# Patient Record
Sex: MALE | Race: WHITE | NOT HISPANIC OR LATINO | Employment: UNEMPLOYED | URBAN - METROPOLITAN AREA
[De-identification: names, ages, dates, MRNs, and addresses within clinical notes are randomized per-mention and may not be internally consistent; named-entity substitution may affect disease eponyms.]

---

## 2024-04-09 ENCOUNTER — APPOINTMENT (OUTPATIENT)
Dept: RADIOLOGY | Facility: HOSPITAL | Age: 24
End: 2024-04-09
Payer: COMMERCIAL

## 2024-04-09 ENCOUNTER — APPOINTMENT (OUTPATIENT)
Dept: CARDIOLOGY | Facility: HOSPITAL | Age: 24
End: 2024-04-09
Payer: COMMERCIAL

## 2024-04-09 ENCOUNTER — HOSPITAL ENCOUNTER (EMERGENCY)
Facility: HOSPITAL | Age: 24
Discharge: HOME | End: 2024-04-09
Attending: GENERAL PRACTICE
Payer: COMMERCIAL

## 2024-04-09 VITALS
RESPIRATION RATE: 17 BRPM | OXYGEN SATURATION: 99 % | SYSTOLIC BLOOD PRESSURE: 117 MMHG | DIASTOLIC BLOOD PRESSURE: 68 MMHG | HEART RATE: 76 BPM | TEMPERATURE: 97 F | WEIGHT: 240 LBS

## 2024-04-09 DIAGNOSIS — R55 SYNCOPE AND COLLAPSE: Primary | ICD-10-CM

## 2024-04-09 LAB
ALBUMIN SERPL BCP-MCNC: 3.9 G/DL (ref 3.4–5)
ALP SERPL-CCNC: 72 U/L (ref 33–120)
ALT SERPL W P-5'-P-CCNC: 22 U/L (ref 7–52)
ANION GAP SERPL CALC-SCNC: 12 MMOL/L (ref 10–20)
AST SERPL W P-5'-P-CCNC: 16 U/L (ref 9–39)
BASOPHILS # BLD AUTO: 0.05 X10*3/UL (ref 0–0.1)
BASOPHILS NFR BLD AUTO: 0.6 %
BILIRUB SERPL-MCNC: 0.3 MG/DL (ref 0–1.2)
BUN SERPL-MCNC: 15 MG/DL (ref 6–23)
CALCIUM SERPL-MCNC: 8.6 MG/DL (ref 8.6–10.3)
CARDIAC TROPONIN I PNL SERPL HS: <3 NG/L (ref 0–20)
CHLORIDE SERPL-SCNC: 106 MMOL/L (ref 98–107)
CO2 SERPL-SCNC: 24 MMOL/L (ref 21–32)
CREAT SERPL-MCNC: 0.78 MG/DL (ref 0.5–1.3)
EGFRCR SERPLBLD CKD-EPI 2021: >90 ML/MIN/1.73M*2
EOSINOPHIL # BLD AUTO: 0.15 X10*3/UL (ref 0–0.7)
EOSINOPHIL NFR BLD AUTO: 1.7 %
ERYTHROCYTE [DISTWIDTH] IN BLOOD BY AUTOMATED COUNT: 12.8 % (ref 11.5–14.5)
GLUCOSE SERPL-MCNC: 82 MG/DL (ref 74–99)
HCT VFR BLD AUTO: 42.5 % (ref 36–52)
HGB BLD-MCNC: 14.2 G/DL (ref 12–17.5)
IMM GRANULOCYTES # BLD AUTO: 0.01 X10*3/UL (ref 0–0.7)
IMM GRANULOCYTES NFR BLD AUTO: 0.1 % (ref 0–0.9)
LYMPHOCYTES # BLD AUTO: 1.62 X10*3/UL (ref 1.2–4.8)
LYMPHOCYTES NFR BLD AUTO: 18.5 %
MCH RBC QN AUTO: 29.2 PG (ref 26–34)
MCHC RBC AUTO-ENTMCNC: 33.4 G/DL (ref 32–36)
MCV RBC AUTO: 87 FL (ref 80–100)
MONOCYTES # BLD AUTO: 0.54 X10*3/UL (ref 0.1–1)
MONOCYTES NFR BLD AUTO: 6.2 %
NEUTROPHILS # BLD AUTO: 6.4 X10*3/UL (ref 1.2–7.7)
NEUTROPHILS NFR BLD AUTO: 72.9 %
NRBC BLD-RTO: 0 /100 WBCS (ref 0–0)
PLATELET # BLD AUTO: 308 X10*3/UL (ref 150–450)
POTASSIUM SERPL-SCNC: 3.8 MMOL/L (ref 3.5–5.3)
PROT SERPL-MCNC: 6.6 G/DL (ref 6.4–8.2)
RBC # BLD AUTO: 4.87 X10*6/UL (ref 4–5.9)
SODIUM SERPL-SCNC: 138 MMOL/L (ref 136–145)
WBC # BLD AUTO: 8.8 X10*3/UL (ref 4.4–11.3)

## 2024-04-09 PROCEDURE — 84484 ASSAY OF TROPONIN QUANT: CPT | Performed by: GENERAL PRACTICE

## 2024-04-09 PROCEDURE — 85025 COMPLETE CBC W/AUTO DIFF WBC: CPT | Performed by: GENERAL PRACTICE

## 2024-04-09 PROCEDURE — 99285 EMERGENCY DEPT VISIT HI MDM: CPT | Mod: 25

## 2024-04-09 PROCEDURE — 36415 COLL VENOUS BLD VENIPUNCTURE: CPT | Performed by: GENERAL PRACTICE

## 2024-04-09 PROCEDURE — 71045 X-RAY EXAM CHEST 1 VIEW: CPT

## 2024-04-09 PROCEDURE — 71045 X-RAY EXAM CHEST 1 VIEW: CPT | Performed by: RADIOLOGY

## 2024-04-09 PROCEDURE — 80053 COMPREHEN METABOLIC PANEL: CPT | Performed by: GENERAL PRACTICE

## 2024-04-09 PROCEDURE — 93005 ELECTROCARDIOGRAM TRACING: CPT

## 2024-04-09 PROCEDURE — 70450 CT HEAD/BRAIN W/O DYE: CPT | Performed by: RADIOLOGY

## 2024-04-09 PROCEDURE — 70450 CT HEAD/BRAIN W/O DYE: CPT

## 2024-04-09 ASSESSMENT — PAIN SCALES - GENERAL
PAINLEVEL_OUTOF10: 0 - NO PAIN

## 2024-04-09 ASSESSMENT — COLUMBIA-SUICIDE SEVERITY RATING SCALE - C-SSRS
5. HAVE YOU STARTED TO WORK OUT OR WORKED OUT THE DETAILS OF HOW TO KILL YOURSELF? DO YOU INTEND TO CARRY OUT THIS PLAN?: NO
4. HAVE YOU HAD THESE THOUGHTS AND HAD SOME INTENTION OF ACTING ON THEM?: NO
2. HAVE YOU ACTUALLY HAD ANY THOUGHTS OF KILLING YOURSELF?: YES
1. IN THE PAST MONTH, HAVE YOU WISHED YOU WERE DEAD OR WISHED YOU COULD GO TO SLEEP AND NOT WAKE UP?: YES
6. HAVE YOU EVER DONE ANYTHING, STARTED TO DO ANYTHING, OR PREPARED TO DO ANYTHING TO END YOUR LIFE?: NO

## 2024-04-09 NOTE — ED TRIAGE NOTES
Pt to ED via EMS from [place] for c/o syncopal episode. Pt states she felt faint and had syncopal episode falling into a wall & hitting her head. Pt arrives to ED in C-collar placed by EMS. Pt denies SI/HI at the moment but states that is what she's being seen at [place] for currently.

## 2024-04-10 LAB
ATRIAL RATE: 82 BPM
P AXIS: 59 DEGREES
P OFFSET: 201 MS
P ONSET: 137 MS
PR INTERVAL: 154 MS
Q ONSET: 214 MS
QRS COUNT: 13 BEATS
QRS DURATION: 92 MS
QT INTERVAL: 376 MS
QTC CALCULATION(BAZETT): 439 MS
QTC FREDERICIA: 417 MS
R AXIS: 25 DEGREES
T AXIS: 29 DEGREES
T OFFSET: 402 MS
VENTRICULAR RATE: 82 BPM

## 2024-04-16 ENCOUNTER — APPOINTMENT (OUTPATIENT)
Dept: PRIMARY CARE | Facility: CLINIC | Age: 24
End: 2024-04-16
Payer: COMMERCIAL

## 2024-04-16 NOTE — ED PROVIDER NOTES
HPI   Chief Complaint   Patient presents with    Syncope       HPI: 23-year-old transgender male to female presents following a syncopal episode.  The patient reports a history of POTS.  According to the patient she was walking outside at [place] when she began to feel very lightheaded and had a syncopal episode.  She is unsure whether she hit her head.  She is denying chest pain, shortness of breath, leg swelling history of DVT/PE.      Limitations to history: None  Independent Historians: Patient  External Records Reviewed: HIE, outpatient notes, inpatient notes  ------------------------------------------------------------------------------------------------------------------------------------------  ROS: a ten point review of systems was performed and was negative except as per HPI.  ------------------------------------------------------------------------------------------------------------------------------------------  PMH / PSH: as per HPI, otherwise reviewed in EMR  MEDS: as per HPI, otherwise reviewed in EMR  ALLERGIES: as per HPI, otherwise reviewed in EMR  SocH:  as per HPI, otherwise reviewed in EMR  FH:  as per HPI, otherwise reviewed in EMR  ------------------------------------------------------------------------------------------------------------------------------------------  Physical Exam:  VS: As documented in the triage note and EMR flowsheet from this visit was reviewed  General: Well appearing. No acute distress.   Eyes:  Extraocular movements grossly intact. No scleral icterus. No discharge  HEENT:  Normocephalic.  Atraumatic  Neck: Moves neck freely. No gross masses  CV: Regular rhythm. No murmurs, rubs or gallops   Resp: Clear to auscultation bilaterally. No respiratory distress.    GI: Soft, no masses, nontender. No rebound tenderness or guarding  MSK: Symmetric muscle bulk. No deformities. No lower extremity edema.    Skin: Warm, dry, intact.   Neuro: No focal deficits.  A&O x3.    Psych: Appropriate for situation  ------------------------------------------------------------------------------------------------------------------------------------------  Hospital Course / Medical Decision Making:  Independent Interpretations: CT head, chest xray  EKG as interpreted by me: Normal sinus rhythm at 82 bpm with a normal axis, no bundle branch block and no signs of acute ischemia    MDM: This is a 23-year-old transgender male to female presenting following a syncopal episode.  The patient reports a history of POTS.  Troponin nonelevated.  EKG is nonischemic.  Chest x-ray shows no acute cardiopulmonary process.  CT shows no acute intracranial process.  The patient remained asymptomatic and neurologically intact for the duration of her stay in the ED.  She feels safe going back to [place].  The patient will be transferred back to [place] with instructions to follow-up with her primary care physician as soon as possible and return to the ED for any concerns    Discussion of Management with Other Providers:   I discussed the patient/results with: Emergency medicine team    Final diagnosis and disposition as below.    Results for orders placed or performed during the hospital encounter of 04/09/24  -CBC and Auto Differential:        Result                      Value             Ref Range           WBC                         8.8               4.4 - 11.3 x*       nRBC                        0.0               0.0 - 0.0 /1*       RBC                         4.87              4.00 - 5.90 *       Hemoglobin                  14.2              12.0 - 17.5 *       Hematocrit                  42.5              36.0 - 52.0 %       MCV                         87                80 - 100 fL         MCH                         29.2              26.0 - 34.0 *       MCHC                        33.4              32.0 - 36.0 *       RDW                         12.8              11.5 - 14.5 %        Platelets                   308               150 - 450 x1*       Neutrophils %               72.9              40.0 - 80.0 %       Immature Granulocytes *     0.1               0.0 - 0.9 %         Lymphocytes %               18.5              13.0 - 44.0 %       Monocytes %                 6.2               2.0 - 10.0 %        Eosinophils %               1.7               0.0 - 6.0 %         Basophils %                 0.6               0.0 - 2.0 %         Neutrophils Absolute        6.40              1.20 - 7.70 *       Immature Granulocytes *     0.01              0.00 - 0.70 *       Lymphocytes Absolute        1.62              1.20 - 4.80 *       Monocytes Absolute          0.54              0.10 - 1.00 *       Eosinophils Absolute        0.15              0.00 - 0.70 *       Basophils Absolute          0.05              0.00 - 0.10 *  -Comprehensive metabolic panel:        Result                      Value             Ref Range           Glucose                     82                74 - 99 mg/dL       Sodium                      138               136 - 145 mm*       Potassium                   3.8               3.5 - 5.3 mm*       Chloride                    106               98 - 107 mmo*       Bicarbonate                 24                21 - 32 mmol*       Anion Gap                   12                10 - 20 mmol*       Urea Nitrogen               15                6 - 23 mg/dL        Creatinine                  0.78              0.50 - 1.30 *       eGFR                        >90               >60 mL/min/1*       Calcium                     8.6               8.6 - 10.3 m*       Albumin                     3.9               3.4 - 5.0 g/*       Alkaline Phosphatase        72                33 - 120 U/L        Total Protein               6.6               6.4 - 8.2 g/*       AST                         16                9 - 39 U/L          Bilirubin, Total            0.3               0.0 - 1.2 mg*        ALT                         22                7 - 52 U/L     -Troponin I, High Sensitivity:        Result                      Value             Ref Range           Troponin I, High Sensi*     <3                0 - 20 ng/L    -ECG 12 lead:        Result                      Value             Ref Range           Ventricular Rate            82                BPM                 Atrial Rate                 82                BPM                 KS Interval                 154               ms                  QRS Duration                92                ms                  QT Interval                 376               ms                  QTC Calculation(Bazett)     439               ms                  P Axis                      59                degrees             R Axis                      25                degrees             T Axis                      29                degrees             QRS Count                   13                beats               Q Onset                     214               ms                  P Onset                     137               ms                  P Offset                    201               ms                  T Offset                    402               ms                  QTC [place]              417               ms             CT head wo IV contrast   Final Result    No acute intracranial abnormality. Consider follow-up with MRI as    warranted.                Signed by: Gala Laws 4/9/2024 5:52 PM    Dictation workstation:   [id number]     XR chest 1 view   Final Result    1.  No active cardiopulmonary process.                Signed by: Rudolph Turcios 4/9/2024 3:06 PM    Dictation workstation:   [id number]                               No data recorded                   Patient History   No past medical history on file.  No past surgical history on file.  No family history on file.  Social History     Tobacco Use    Smoking status: Not on file    Smokeless  tobacco: Not on file   Substance Use Topics    Alcohol use: Not on file    Drug use: Not on file       Physical Exam   ED Triage Vitals   Temperature Heart Rate Respirations BP   04/09/24 1427 04/09/24 1427 04/09/24 1427 04/09/24 1427   36.6 °C (97.8 °F) 80 18 113/74      Pulse Ox Temp Source Heart Rate Source Patient Position   04/09/24 1427 04/09/24 1530 04/09/24 1530 04/09/24 1530   100 % Oral Monitor Sitting      BP Location FiO2 (%)     04/09/24 1530 --     Left arm        Physical Exam    ED Course & MDM   Diagnoses as of 04/16/24 1140   Syncope and collapse       Medical Decision Making      Procedure  Procedures     Israel Ley,   04/16/24 1145